# Patient Record
Sex: FEMALE | Race: WHITE | URBAN - METROPOLITAN AREA
[De-identification: names, ages, dates, MRNs, and addresses within clinical notes are randomized per-mention and may not be internally consistent; named-entity substitution may affect disease eponyms.]

---

## 2024-09-23 ENCOUNTER — OFFICE VISIT (OUTPATIENT)
Dept: OBGYN CLINIC | Facility: CLINIC | Age: 69
End: 2024-09-23
Payer: COMMERCIAL

## 2024-09-23 VITALS — DIASTOLIC BLOOD PRESSURE: 80 MMHG | WEIGHT: 136 LBS | SYSTOLIC BLOOD PRESSURE: 142 MMHG

## 2024-09-23 DIAGNOSIS — N90.89 VULVAL LESION: ICD-10-CM

## 2024-09-23 DIAGNOSIS — Z91.89 DES EXPOSURE IN UTERO: ICD-10-CM

## 2024-09-23 DIAGNOSIS — L29.2 VULVAR ITCHING: ICD-10-CM

## 2024-09-23 DIAGNOSIS — Z01.419 ENCOUNTER FOR ANNUAL ROUTINE GYNECOLOGICAL EXAMINATION: Primary | ICD-10-CM

## 2024-09-23 PROCEDURE — 87624 HPV HI-RISK TYP POOLED RSLT: CPT | Performed by: OBSTETRICS & GYNECOLOGY

## 2024-09-23 PROCEDURE — 88175 CYTOPATH C/V AUTO FLUID REDO: CPT | Performed by: OBSTETRICS & GYNECOLOGY

## 2024-09-23 RX ORDER — ESCITALOPRAM OXALATE 10 MG/1
10 TABLET ORAL DAILY
COMMUNITY

## 2024-09-23 RX ORDER — AMLODIPINE BESYLATE 5 MG/1
TABLET ORAL DAILY
COMMUNITY

## 2024-09-23 RX ORDER — CLOBETASOL PROPIONATE 0.5 MG/G
OINTMENT TOPICAL
Qty: 60 G | Refills: 1 | Status: SHIPPED | OUTPATIENT
Start: 2024-09-23

## 2024-09-23 RX ORDER — FEXOFENADINE HCL 180 MG/1
180 TABLET ORAL DAILY
COMMUNITY

## 2024-09-23 RX ORDER — CLOBETASOL PROPIONATE 0.5 MG/G
1 OINTMENT TOPICAL 2 TIMES DAILY
Qty: 60 G | Refills: 1 | Status: SHIPPED | OUTPATIENT
Start: 2024-09-23 | End: 2024-09-23

## 2024-09-23 RX ORDER — OLMESARTAN MEDOXOMIL AND HYDROCHLOROTHIAZIDE 40/12.5 40; 12.5 MG/1; MG/1
1 TABLET ORAL DAILY
COMMUNITY

## 2024-09-23 NOTE — PROGRESS NOTES
ANNUAL GYN EXAM  [facility]    CHIEF COMPLAINT:    Chief Complaint   Patient presents with    New Patient    Annual    Vaginal Problem     itching      HISTORY OF PRESENT ILLNESS:   Liliana Caceres is a 68 year old female   who presents for annual well woman visit.  She is feeling well without complaints.  Has not seen GYN in about 5-10 years.   Last menses age 48. No HRT. Denies postmenopausal bleeding. No recent hot flashes.    Not recently sexually active.     Has had intermittent vaginal itching, but increased of late. Mostly, anterior vulva. Has tried over-the-counter washes.   Uses cotton underwear. States worse when she would travel to [place] and being in a bathing suit. Tried over-the-counter wash for itching, but without relief. Denies UTI symptoms    States IKER exposed. History T shaped uterus and cervical incompetence.  PAST GYNECOLOGICAL HISTORY & OTHER PREVENTIVE MEDICINE  LMP: No LMP recorded. Patient is postmenopausal.  Use of Birth Control (if yes, specify type): Postmenopausal (2024 11:10 AM)  Hx Prior Abnormal Pap: No (2024 11:10 AM)  Pap Result Notes: Unknown (2024 11:10 AM)    Complications: denies PMPB  Gravita/Parity:   Contraception: current -menopause  Sexually transmitted disease history:None  Number of sexual partners: current sexual partners: 0, yrs, Lifetime partners:   Pap history: 5-10 yearsLast pap/result: normal per pt ; history abnormals: denies  Date of last mammogram: UTD -; history abnormals dense breast  Last Colonoscopy: UTD; history abnormals normal  Abuse history: denies  Vaginal discharge: denies  Bladder symptoms: denies    PAST MEDICAL HISTORY:   Past Medical History:    Anemia    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    Ectopia    High blood pressure    Infertility, female        PAST SURGICAL HISTORY:   Past Surgical History:   Procedure Laterality Date    Laparoscopic          PAST OB HISTORY:  OB History    Para Term  AB Living   2 1 1   1 1    SAB IAB Ectopic Multiple Live Births       1   1      # Outcome Date GA Lbr Antonio/2nd Weight Sex Type Anes PTL Lv   2 Ectopic            1 Term     F NORMAL SPONT   LILIANA       CURRENT MEDICATIONS:      Current Outpatient Medications:     escitalopram 10 MG Oral Tab, Take 1 tablet (10 mg total) by mouth daily., Disp: , Rfl:     fexofenadine 180 MG Oral Tab, Take 1 tablet (180 mg total) by mouth daily., Disp: , Rfl:     Olmesartan Medoxomil-HCTZ 40-12.5 MG Oral Tab, Take 1 tablet by mouth daily., Disp: , Rfl:     amLODIPine 5 MG Oral Tab, Take by mouth daily., Disp: , Rfl:     clobetasol 0.05 % External Ointment, Apply sparingly twice daily for 1 week, then once daily for one week, then once weekly., Disp: 60 g, Rfl: 1    ALLERGIES:  Allergies   Allergen Reactions    Penicillins HIVES and RASH    Sulfa Antibiotics HIVES, ITCHING and SWELLING       SOCIAL HISTORY:       FAMILY HISTORY:  No family history on file.  ASSESSMENTS:  REVIEW OF SYSTEMS:  CONSTITUTIONAL:  negative for fevers, chills and sweats    EYES:  negative for  blurred vision and visual disturbance  RESPIRATORY:  negative for  cough and shortness of breath  CARDIOVASCULAR:  negative for  chest pain, palpitations  GASTROINTESTINAL:  No constipation/diarrhea, no pain  GENITOURINARY:  See History of Present Illness  INTEGUMENT/BREAST: Breast: no masses, no nipple discharge  ENDOCRINE:  negative for acne, constipation, diarrhea, cold intolerance, heat intolerance, fatigue, hair loss, weight gain and weight loss  MUSCULOSKELETAL:  negative for joint pain  NEUROLOGICAL:  negative for dizziness/lightheadedness and headaches  BEHAVIOR/PSYCH:  Negative for depressed mood, anhedonia and anxiety    PHYSICAL EXAM  No LMP recorded. Patient is postmenopausal.   Vitals:    09/23/24 1101   BP: 142/80   Weight: 136 lb (61.7 kg)       CONSTITUTIONAL: Awake, alert, cooperative, no apparent distress, and appears stated age   NECK:  symmetrical, trachea midline, no  adenopathy, thyroid symmetric, not enlarged   LUNGS: respiration unlabored  CARDIOVASCULAR: no peripheral edema or varicosities, skin warm and dry  ABDOMEN: Soft, non-distended, non-tender, no masses palpated    CHEST/BREASTS: Breasts symmetrical, skin without lesion(s), no nipple retraction or dimpling, no nipple discharge, no masses palpated, no axillary or supraclavicular adenopathy  GENITAL/URINARY:    External Genitalia:  General appearance; normal, Hair distribution; normal, Lesions - agglutinated and thickened lesion clitoral torres 10 mm x 20 mm  And erythematous lesion 5 o'clock vaginal introitus    Urethral Meatus:  Lesions absent, Prolapse absent  Bladder:  Tenderness absent, Cystocele absent  Vagina:  Discharge absent, Lesions absent, Pelvic support normal  Cervix:  Lesions absent, Discharge absent, Tenderness absent  Uterus:  Size normal, Masses absent, Tenderness absent  Adnexa:  Masses absent, Tenderness absent  Anus/Perineum:  Lesions absent    MUSCULOSKELETAL: There is no redness, warmth, or swelling of the joints.  Full range of motion noted.  Motor strength is 5 out of 5 all extremities bilaterally.  Tone is normal.  NEUROLOGIC: Patient is awake, alert and oriented to name, place and time.  Casual gait is normal.  SKIN: no bruising or bleeding and no rashes  PSYCHIATRIC: Behavior:  Appropriate  Mood:  appropriate  ASSESSMENT AND PLAN:  1. Encounter for annual routine gynecological examination    - ThinPrep PAP Smear; Future  - Hpv Dna  High Risk , Thin Prep Collect; Future  - ThinPrep PAP Smear  - Hpv Dna  High Risk , Thin Prep Collect    2. 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 Vulvar itching  4. Vulval lesion  Suspect lichen sclerosus vs vulvar hyperplasia.  Recommend follow up for vulvar biopsy    - clobetasol 0.05 % External Ointment; Apply sparingly twice daily  for 1 week, then once daily for one week, then once weekly.  Dispense: 60 g; Refill: 1       Preventive Medicine in a 68 year old female  Health Maintenance Topics with due status: Overdue       Topic Date Due    Annual Physical Never done    Colorectal Cancer Screening Never done    Mammogram Never done    Zoster Vaccines Never done    DEXA Scan Never done    Pneumococcal Vaccine: 65+ Years Never done    Annual Depression Screening Never done    Fall Risk Screening (Annual) Never done    COVID-19 Vaccine Never done       COUNSELING/EDUCATION PERFORMED:   Cervical Cancer Screening  Breast Cancer Screening - monthly self breast exam  Colon Cancer screening  Safe sex/STD transmission/use of condoms     Alexandria Arenas MD

## 2024-09-24 LAB — HPV E6+E7 MRNA CVX QL NAA+PROBE: NEGATIVE

## 2024-11-18 ENCOUNTER — OFFICE VISIT (OUTPATIENT)
Dept: OBGYN CLINIC | Facility: CLINIC | Age: 69
End: 2024-11-18
Payer: COMMERCIAL

## 2024-11-18 VITALS — WEIGHT: 136 LBS | DIASTOLIC BLOOD PRESSURE: 80 MMHG | SYSTOLIC BLOOD PRESSURE: 132 MMHG

## 2024-11-18 DIAGNOSIS — N90.89 VULVAL LESION: Primary | ICD-10-CM

## 2024-11-18 PROCEDURE — 88305 TISSUE EXAM BY PATHOLOGIST: CPT | Performed by: OBSTETRICS & GYNECOLOGY

## 2024-11-18 NOTE — PROCEDURES
Vulvar Biopsy - Procedure Note    Procedure: Vulvar biopsy  Indication: Vulvar lesions  Vulva - lesions Lesions - agglutinated and thickened lesion clitoral torres 10 mm x 20 mm  And erythematous lesion 5 o'clock vaginal introitus/perineal region       Procedure: Risks, benefits and alternatives to procedure were discussed with patient. Informed consent was obtained. Time out was performed just prior to initiation of procedure.   Area was prepped with Betadine and sterile technique was employed. 1 cc 1% lidocaine was injected subdermally.   An 11 blade scalpel was used to shave the lesions at the chosen site. The bases were cauterized with silver nitrate. Excellent hemostasis was obtained.   Antibiotic ointment and a bandage was placed over the biopsy site.    The patient tolerated the procedure well. No complications noted.    Alexandria Arenas MD

## (undated) NOTE — LETTER
Liliana Caceres, :1955    CONSENT FOR PROCEDURE/SEDATION    1. I authorize the performance upon Liliana Caceres  the following: Vulvar Biopsy    2. I authorize Dr. Alexandria Arenas MD (and whomever is designated as the doctor’s assistant), to perform the above-mentioned procedures.    3. If any unforeseen conditions arise during this procedure calling for additional  procedures, operations, or medications (including anesthesia and blood transfusion), I further request and authorize the doctor to do whatever he/she deems advisable in my interest.    4. I consent to the taking and reproduction of any photographs in the course of this procedure for professional purposes.    5. I consent to the administration of such sedation as may be considered necessary or advisable by the physician responsible for this service, with the exception of ______________________________________________________    6. I have been informed by my doctor of the nature and purpose of this procedure sedation, possible alternative methods of treatment, risk involved and possible complications.      Signature of Patient:_______________________________________________    Signature of person authorized to consent for patient:  _______________________________________________________________    Relationship to patient: ____________________________________________    Witness: _________________________________________ Date:___________     Physician Signature: _______________________________ Date:___________